# Patient Record
Sex: FEMALE | Race: WHITE
[De-identification: names, ages, dates, MRNs, and addresses within clinical notes are randomized per-mention and may not be internally consistent; named-entity substitution may affect disease eponyms.]

---

## 2020-08-01 ENCOUNTER — HOSPITAL ENCOUNTER (INPATIENT)
Dept: HOSPITAL 47 - EC | Age: 55
LOS: 4 days | Discharge: HOME | DRG: 871 | End: 2020-08-05
Attending: HOSPITALIST | Admitting: HOSPITALIST
Payer: COMMERCIAL

## 2020-08-01 DIAGNOSIS — U07.1: ICD-10-CM

## 2020-08-01 DIAGNOSIS — E87.1: ICD-10-CM

## 2020-08-01 DIAGNOSIS — F41.0: ICD-10-CM

## 2020-08-01 DIAGNOSIS — F41.9: ICD-10-CM

## 2020-08-01 DIAGNOSIS — Z98.890: ICD-10-CM

## 2020-08-01 DIAGNOSIS — R59.0: ICD-10-CM

## 2020-08-01 DIAGNOSIS — R79.82: ICD-10-CM

## 2020-08-01 DIAGNOSIS — J84.9: ICD-10-CM

## 2020-08-01 DIAGNOSIS — J12.89: ICD-10-CM

## 2020-08-01 DIAGNOSIS — R79.89: ICD-10-CM

## 2020-08-01 DIAGNOSIS — E07.9: ICD-10-CM

## 2020-08-01 DIAGNOSIS — R51: ICD-10-CM

## 2020-08-01 DIAGNOSIS — A41.9: Primary | ICD-10-CM

## 2020-08-01 DIAGNOSIS — Z82.49: ICD-10-CM

## 2020-08-01 DIAGNOSIS — Z88.2: ICD-10-CM

## 2020-08-01 DIAGNOSIS — Z87.891: ICD-10-CM

## 2020-08-01 DIAGNOSIS — J90: ICD-10-CM

## 2020-08-01 LAB
ALBUMIN SERPL-MCNC: 4.2 G/DL (ref 3.5–5)
ALP SERPL-CCNC: 64 U/L (ref 38–126)
ALT SERPL-CCNC: 18 U/L (ref 4–34)
ANION GAP SERPL CALC-SCNC: 10 MMOL/L
APTT BLD: 26.5 SEC (ref 22–30)
AST SERPL-CCNC: 20 U/L (ref 14–36)
BASOPHILS # BLD AUTO: 0 K/UL (ref 0–0.2)
BASOPHILS NFR BLD AUTO: 0 %
BUN SERPL-SCNC: 6 MG/DL (ref 7–17)
CALCIUM SPEC-MCNC: 9.3 MG/DL (ref 8.4–10.2)
CHLORIDE SERPL-SCNC: 100 MMOL/L (ref 98–107)
CO2 SERPL-SCNC: 23 MMOL/L (ref 22–30)
D DIMER PPP FEU-MCNC: 0.87 MG/L FEU (ref ?–0.6)
EOSINOPHIL # BLD AUTO: 0.5 K/UL (ref 0–0.7)
EOSINOPHIL NFR BLD AUTO: 3 %
ERYTHROCYTE [DISTWIDTH] IN BLOOD BY AUTOMATED COUNT: 4.87 M/UL (ref 3.8–5.4)
ERYTHROCYTE [DISTWIDTH] IN BLOOD: 12.6 % (ref 11.5–15.5)
FERRITIN SERPL-MCNC: 167.5 NG/ML (ref 10–291)
GLUCOSE SERPL-MCNC: 101 MG/DL (ref 74–99)
HCT VFR BLD AUTO: 46.8 % (ref 34–46)
HGB BLD-MCNC: 15.4 GM/DL (ref 11.4–16)
INR PPP: 0.9 (ref ?–1.2)
KETONES UR QL STRIP.AUTO: (no result)
LDH SPEC-CCNC: 477 U/L (ref 313–618)
LYMPHOCYTES # SPEC AUTO: 0.6 K/UL (ref 1–4.8)
LYMPHOCYTES NFR SPEC AUTO: 4 %
MAGNESIUM SPEC-SCNC: 1.8 MG/DL (ref 1.6–2.3)
MCH RBC QN AUTO: 31.6 PG (ref 25–35)
MCHC RBC AUTO-ENTMCNC: 32.9 G/DL (ref 31–37)
MCV RBC AUTO: 96.2 FL (ref 80–100)
MONOCYTES # BLD AUTO: 0.3 K/UL (ref 0–1)
MONOCYTES NFR BLD AUTO: 2 %
NEUTROPHILS # BLD AUTO: 14.8 K/UL (ref 1.3–7.7)
NEUTROPHILS NFR BLD AUTO: 91 %
PH UR: 5.5 [PH] (ref 5–8)
PLATELET # BLD AUTO: 303 K/UL (ref 150–450)
POTASSIUM SERPL-SCNC: 4.4 MMOL/L (ref 3.5–5.1)
PROT SERPL-MCNC: 7.1 G/DL (ref 6.3–8.2)
PT BLD: 9.7 SEC (ref 9–12)
SODIUM SERPL-SCNC: 133 MMOL/L (ref 137–145)
SP GR UR: 1.02 (ref 1–1.03)
UROBILINOGEN UR QL STRIP: 2 MG/DL (ref ?–2)
WBC # BLD AUTO: 16.2 K/UL (ref 3.8–10.6)

## 2020-08-01 PROCEDURE — 71275 CT ANGIOGRAPHY CHEST: CPT

## 2020-08-01 PROCEDURE — 83735 ASSAY OF MAGNESIUM: CPT

## 2020-08-01 PROCEDURE — 85652 RBC SED RATE AUTOMATED: CPT

## 2020-08-01 PROCEDURE — 85610 PROTHROMBIN TIME: CPT

## 2020-08-01 PROCEDURE — 93306 TTE W/DOPPLER COMPLETE: CPT

## 2020-08-01 PROCEDURE — 85379 FIBRIN DEGRADATION QUANT: CPT

## 2020-08-01 PROCEDURE — 80053 COMPREHEN METABOLIC PANEL: CPT

## 2020-08-01 PROCEDURE — 83615 LACTATE (LD) (LDH) ENZYME: CPT

## 2020-08-01 PROCEDURE — 85025 COMPLETE CBC W/AUTO DIFF WBC: CPT

## 2020-08-01 PROCEDURE — 99285 EMERGENCY DEPT VISIT HI MDM: CPT

## 2020-08-01 PROCEDURE — 84484 ASSAY OF TROPONIN QUANT: CPT

## 2020-08-01 PROCEDURE — 93005 ELECTROCARDIOGRAM TRACING: CPT

## 2020-08-01 PROCEDURE — 71045 X-RAY EXAM CHEST 1 VIEW: CPT

## 2020-08-01 PROCEDURE — 96375 TX/PRO/DX INJ NEW DRUG ADDON: CPT

## 2020-08-01 PROCEDURE — 82728 ASSAY OF FERRITIN: CPT

## 2020-08-01 PROCEDURE — 87040 BLOOD CULTURE FOR BACTERIA: CPT

## 2020-08-01 PROCEDURE — 96374 THER/PROPH/DIAG INJ IV PUSH: CPT

## 2020-08-01 PROCEDURE — 80306 DRUG TEST PRSMV INSTRMNT: CPT

## 2020-08-01 PROCEDURE — 81003 URINALYSIS AUTO W/O SCOPE: CPT

## 2020-08-01 PROCEDURE — 83605 ASSAY OF LACTIC ACID: CPT

## 2020-08-01 PROCEDURE — 85730 THROMBOPLASTIN TIME PARTIAL: CPT

## 2020-08-01 PROCEDURE — 86140 C-REACTIVE PROTEIN: CPT

## 2020-08-01 PROCEDURE — 87449 NOS EACH ORGANISM AG IA: CPT

## 2020-08-01 PROCEDURE — 94640 AIRWAY INHALATION TREATMENT: CPT

## 2020-08-01 PROCEDURE — 84145 PROCALCITONIN (PCT): CPT

## 2020-08-01 PROCEDURE — 36415 COLL VENOUS BLD VENIPUNCTURE: CPT

## 2020-08-01 RX ADMIN — ENOXAPARIN SODIUM SCH: 40 INJECTION SUBCUTANEOUS at 21:37

## 2020-08-01 RX ADMIN — ALBUTEROL SULFATE SCH PUFF: 90 AEROSOL, METERED RESPIRATORY (INHALATION) at 20:17

## 2020-08-01 NOTE — HP
HISTORY AND PHYSICAL



CHIEF COMPLAINTS:

Shortness of breath, cough and fever.



HISTORY OF PRESENT ILLNESS:

This 55-year-old woman with a past medical history of anxiety, history of 
nicotine

dependence, being followed by Dr. Henri Funez, not feeling well over the past 
several

days.  The patient had a cough, fever and some weakness.  The patient also 
complains of

shortness of breath.  The patient also complaining of increasing fatigue.  The 
patient

came to [facility] with similar complaints and chest x-ray showed 
bilateral

infiltrates and the lab values showed increased WBC, elevated 0.87.  C-reactive

protein elevated at 210.  CT angio showed patchy bilateral ground-glass 
appearance

suspicious for COVID-19.  Patient admitted for further evaluation and treatment.
1.2 cm

thyroid lesion was also noted to be followed up in the outpatient setting.  
Small

bilateral pleural effusions also noted.  The patient was apparently living on 
[facility]

and planning to move to [place] to be with her mother at this time and the 
patient has

been living with her parents for the last couple of days according to her.  The 
patient

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
 The

patient does not have any history of any recent travel or contact with infected 
persons

at this time.  There is no history any headache, loss of consciousness, 
seizures.  No

history of chest pain, palpitation either.



PAST MEDICAL HISTORY:

History of anxiety, history of nicotine dependence.



MEDICATIONS:

None.



ALLERGIES:

SULFA.



FAMILY HISTORY:

History of valve replacement with pacemaker.



SOCIAL HISTORY:

Previous history of smoking.  No history of alcohol intake.



REVIEW OF SYSTEMS:

ENT: No diminished vision. No diminished hearing.

CARDIOVASCULAR as mentioned earlier.

RESPIRATORY: As mentioned earlier.

GI: No nausea or vomiting.

 no dysuria.

NERVOUS SYSTEM: No numbness or weakness.

ALLERGY/IMMUNOLOGY: No asthma or hayfever.

MUSCULOSKELETAL: As mentioned earlier.

DERMATOLOGY: Negative.

ENDOCRINE: No history of diabetes or hypothyroidism.

CONSTITUTIONAL: As mentioned earlier.

DERMATOLOGY: Negative.

RHEUMATOLOGY negative.

PSYCHIATRY as mentioned earlier.



PHYSICAL EXAM:

Alert and oriented times three. Pulse 94, blood pressure 114/76, respiration 18,

temperature 98.4, pulse ox 98% on 2 L.

HEENT:  Conjunctivae normal. Oral mucosa moist.

Neck is no jugular venous distention.  No carotid bruit. No lymph node 
enlargement.

CARDIOVASCULAR: S1, S2 muffled.

RESPIRATORY: Breath sounds diminished in the bases. A few scattered rhonchi.  No

crackles.

ABDOMEN:  Soft, nontender.  No mass palpable.

LEGS no edema. No swelling.

NERVOUS SYSTEM: Higher functions as mentioned earlier. Moves all 4 limbs.  No 
focal

motor or sensory deficits.

Lymphatics: No lymph nodes palpable in the neck, axillae or groin.

SKIN: No ulcer, no rash and no bleeding.

JOINTS no active deforming arthropathy.



LABS:

WBC 16.2, and INR is 0.9.  D-dimer is 0.87.  Sodium is 133.  C-reactive protein 
is

210.9. Chest x-ray and CT scan personally reviewed.  EKG reviewed and showed

nonspecific ST-T changes and QTc of 428.



ASSESSMENT:

1. Acute bilateral pneumonia possibly gram-negative, possibly Covid-19 
pneumonia.

2. Increased WBC.

3. Increased D-dimer without any evidence of pulmonary embolism.

4. Right thyroid lesion 1.2 cm needs outpatient followup.

5. Hyponatremia.

6. Increased CRP.

7. History of anxiety.

8. History of nicotine dependence.

9. FULL CODE.



RECOMMENDATIONS AND DISCUSSION:

This 55-year-old woman who presented with multiple complex medical issues, at 
this time

I recommend continue the current medications, continue symptomatic treatment.

Recommend broad-spectrum IV antibiotics for presumed pneumonia. COVID-19 test is

pending.  I would also recommend infectious disease evaluation.  I would 
recommend

blood cultures, sputum cultures, and other evaluation to rule out the 
possibility of

Covid-19 as well. LDH is negative.  Overall prognosis guarded because of 
multiple

complex medical issues and further recommendations to follow.  A copy of this 
forwarded

to Dr. Henri Funez who is the primary physician.





MMODL / IJN: [id number] / Job#: [id number]

MADIHA

## 2020-08-01 NOTE — XR
EXAMINATION TYPE: XR chest 1V portable

 

DATE OF EXAM: 8/1/2020

 

HISTORY: Suspected COVID-19 pneumonia.

 

REFERENCE: NONE.

 

FINDINGS: Lung volumes are prominent. There is patchy peripheral airspace disease seen bilaterally. T
he heart is not enlarged. Pleural spaces are clear.

 

IMPRESSION: 

1. PATCHY, BILATERAL AIRSPACE DISEASE.

2. PLEASE CORRELATE FOR COPD.

## 2020-08-01 NOTE — ED
General Adult HPI





- General


Chief complaint: Shortness of Breath


Stated complaint: SOB


Time Seen by Provider: 20 11:45


Source: patient, RN notes reviewed, old records reviewed


Mode of arrival: wheelchair


Limitations: no limitations





- History of Present Illness


Initial comments: 





This a 55-year-old female who presents emergency Department complaining of 

shortness of breath for 2 days and a low-grade fever as of yesterday.  Patient 

states she's also been very fatigued.  Patient states she has been moving for 

the last few days and is moving out of state to [place].  Patient states she's 

been under quite a bit of stress and has had a couple of panic attacks as well. 

Patient also has noticed her heart rate is racing.  Patient denies any chest p

ain but she states she does feel like it's tight in her chest when she has been 

having a panic attacks.  Patient denies any abdominal pain patient denies nausea

vomiting diarrhea.  Patient has a headache patient denies numbness weakness.  

Patient denies any swelling to the legs or calf tenderness.





- Related Data


                                Home Medications











 Medication  Instructions  Recorded  Confirmed


 


No Known Home Medications  20











                                    Allergies











Allergy/AdvReac Type Severity Reaction Status Date / Time


 


Sulfa (Sulfonamide Allergy  Rash/Hives Verified 20 14:41





Antibiotics)     














Review of Systems


ROS Statement: 


Those systems with pertinent positive or pertinent negative responses have been 

documented in the HPI.





ROS Other: All systems not noted in ROS Statement are negative.





Past Medical History


Past Medical History: No Reported History


History of Any Multi-Drug Resistant Organisms: None Reported


Past Surgical History:  Section


Past Psychological History: Anxiety


Smoking Status: Former smoker


Past Alcohol Use History: Occasional


Past Drug Use History: None Reported





General Exam





- General Exam Comments


Initial Comments: 





GENERAL:


Patient is well-developed and well-nourished.  Patient is nontoxic and well-

hydrated and is in mild distress.





ENT:


Neck is soft and supple.  No significant lymphadenopathy is noted.  Oropharynx 

is clear.  Moist mucous membranes.  Neck has full range of motion without 

eliciting any pain. 





EYES:


The sclera were anicteric and conjunctiva were pink and moist.  Extraocular 

movements were intact and pupils were equal round and reactive to light.  

Eyelids were unremarkable.





PULMONARY:


Unlabored respirations.  Good breath sounds bilaterally.  No audible rales rhon

chi or wheezing was noted.





CARDIOVASCULAR:


Patient is tachycardic





ABDOMEN:


Soft and nontender with normal bowel sounds.  





SKIN:


Skin is clear with no lesions or rashes and otherwise unremarkable.





NEUROLOGIC:


Patient is alert and oriented x3.  Cranial nerves II through XII are grossly 

intact.  Motor and sensory are also intact.  Normal speech, volume and content. 

Symmetrical smile.  





MUSCULOSKELETAL:


Normal extremities with adequate strength and full range of motion.  





LYMPHATICS:


No significant lymphadenopathy is noted





PSYCHIATRIC:


Normal psychiatric evaluation.  


Limitations: no limitations





Course


                                   Vital Signs











  20





  11:42 11:58 12:00


 


Temperature 100.1 F H  


 


Pulse Rate 131 H  


 


Respiratory 22 18 18





Rate   


 


Blood Pressure 122/75  


 


O2 Sat by Pulse 92 L 93 L 93 L





Oximetry   














  20





  12:30 13:00 13:30


 


Temperature   


 


Pulse Rate 110 H 102 H 92


 


Respiratory 18 18 16





Rate   


 


Blood Pressure  119/82 116/74


 


O2 Sat by Pulse 96 97 97





Oximetry   














  20





  14:00 14:22


 


Temperature  98.5 F


 


Pulse Rate 86 


 


Respiratory 18 





Rate  


 


Blood Pressure  


 


O2 Sat by Pulse 98 





Oximetry  














Medical Decision Making





- Medical Decision Making





EKG shows sinus tachycardia 160 bpm ME interval 124 tresses 80 QT interval 308 

QTC is 428.  Patient's EKG shows no ST segment elevation or depression.





Chest x-ray shows groundglass infiltrate.





Computed tomography scan shows no pulmonary embolism but does show bilateral 

infiltrates.





- Lab Data


Result diagrams: 


                                 20 12:12





                                 20 12:12


                                   Lab Results











  20 Range/Units





  12:12 12:12 12:12 


 


WBC  16.2 H    (3.8-10.6)  k/uL


 


RBC  4.87    (3.80-5.40)  m/uL


 


Hgb  15.4    (11.4-16.0)  gm/dL


 


Hct  46.8 H    (34.0-46.0)  %


 


MCV  96.2    (80.0-100.0)  fL


 


MCH  31.6    (25.0-35.0)  pg


 


MCHC  32.9    (31.0-37.0)  g/dL


 


RDW  12.6    (11.5-15.5)  %


 


Plt Count  303    (150-450)  k/uL


 


Neutrophils %  91    %


 


Lymphocytes %  4    %


 


Monocytes %  2    %


 


Eosinophils %  3    %


 


Basophils %  0    %


 


Neutrophils #  14.8 H    (1.3-7.7)  k/uL


 


Lymphocytes #  0.6 L    (1.0-4.8)  k/uL


 


Monocytes #  0.3    (0-1.0)  k/uL


 


Eosinophils #  0.5    (0-0.7)  k/uL


 


Basophils #  0.0    (0-0.2)  k/uL


 


PT   9.7   (9.0-12.0)  sec


 


INR   0.9   (<1.2)  


 


APTT   26.5   (22.0-30.0)  sec


 


D-Dimer   0.87 H   (<0.60)  mg/L FEU


 


Sodium    133 L  (137-145)  mmol/L


 


Potassium    4.4  (3.5-5.1)  mmol/L


 


Chloride    100  ()  mmol/L


 


Carbon Dioxide    23  (22-30)  mmol/L


 


Anion Gap    10  mmol/L


 


BUN    6 L  (7-17)  mg/dL


 


Creatinine    0.51 L  (0.52-1.04)  mg/dL


 


Est GFR (CKD-EPI)AfAm    >90  (>60 ml/min/1.73 sqM)  


 


Est GFR (CKD-EPI)NonAf    >90  (>60 ml/min/1.73 sqM)  


 


Glucose    101 H  (74-99)  mg/dL


 


Plasma Lactic Acid Nikhil     (0.7-2.0)  mmol/L


 


Calcium    9.3  (8.4-10.2)  mg/dL


 


Magnesium    1.8  (1.6-2.3)  mg/dL


 


Total Bilirubin    1.0  (0.2-1.3)  mg/dL


 


AST    20  (14-36)  U/L


 


ALT    18  (4-34)  U/L


 


Alkaline Phosphatase    64  ()  U/L


 


Lactate Dehydrogenase    477  (313-618)  U/L


 


C-Reactive Protein    210.9 H  (<10.0)  mg/L


 


Total Protein    7.1  (6.3-8.2)  g/dL


 


Albumin    4.2  (3.5-5.0)  g/dL














  20 Range/Units





  12:12 


 


WBC   (3.8-10.6)  k/uL


 


RBC   (3.80-5.40)  m/uL


 


Hgb   (11.4-16.0)  gm/dL


 


Hct   (34.0-46.0)  %


 


MCV   (80.0-100.0)  fL


 


MCH   (25.0-35.0)  pg


 


MCHC   (31.0-37.0)  g/dL


 


RDW   (11.5-15.5)  %


 


Plt Count   (150-450)  k/uL


 


Neutrophils %   %


 


Lymphocytes %   %


 


Monocytes %   %


 


Eosinophils %   %


 


Basophils %   %


 


Neutrophils #   (1.3-7.7)  k/uL


 


Lymphocytes #   (1.0-4.8)  k/uL


 


Monocytes #   (0-1.0)  k/uL


 


Eosinophils #   (0-0.7)  k/uL


 


Basophils #   (0-0.2)  k/uL


 


PT   (9.0-12.0)  sec


 


INR   (<1.2)  


 


APTT   (22.0-30.0)  sec


 


D-Dimer   (<0.60)  mg/L FEU


 


Sodium   (137-145)  mmol/L


 


Potassium   (3.5-5.1)  mmol/L


 


Chloride   ()  mmol/L


 


Carbon Dioxide   (22-30)  mmol/L


 


Anion Gap   mmol/L


 


BUN   (7-17)  mg/dL


 


Creatinine   (0.52-1.04)  mg/dL


 


Est GFR (CKD-EPI)AfAm   (>60 ml/min/1.73 sqM)  


 


Est GFR (CKD-EPI)NonAf   (>60 ml/min/1.73 sqM)  


 


Glucose   (74-99)  mg/dL


 


Plasma Lactic Acid Nikhil  1.0  (0.7-2.0)  mmol/L


 


Calcium   (8.4-10.2)  mg/dL


 


Magnesium   (1.6-2.3)  mg/dL


 


Total Bilirubin   (0.2-1.3)  mg/dL


 


AST   (14-36)  U/L


 


ALT   (4-34)  U/L


 


Alkaline Phosphatase   ()  U/L


 


Lactate Dehydrogenase   (313-618)  U/L


 


C-Reactive Protein   (<10.0)  mg/L


 


Total Protein   (6.3-8.2)  g/dL


 


Albumin   (3.5-5.0)  g/dL














Disposition


Clinical Impression: 


 Pneumonia





Disposition: ADMITTED AS IP TO THIS [facility]


Referrals: 


Luis Funez MD [Primary Care Provider] - 1-2 days


Time of Disposition: 14:44

## 2020-08-01 NOTE — CT
EXAMINATION TYPE: CT chest angio for PE

 

DATE OF EXAM: 8/1/2020

 

COMPARISON: None.

 

HISTORY: Difficulty breathing

 

CT DLP: 215.9 mGycm

Automated exposure control for dose reduction was used.

 

CONTRAST: 

CT Chest for pulmonary embolism performed with with IV Contrast, patient injected with 100 mL of Isov
ue 370.

 

FINDINGS: There is apical scarring present bilaterally. There are patchy groundglass opacities in the
 periphery of both lungs suspicious for Covid 19 infection. There is more confluent disease or atelec
tasis at the lung bases bilaterally.

 

There is a 1.2 cm lesion in the right lobe of the thyroid.

 

There is some left axillary adenopathy. The largest lymph node is 6.8 mm in shortest transverse diame
ter. There is no cyst significant mediastinal or hilar adenopathy.

 

There is no evidence of pulmonary embolus.

 

Aorta is normal in caliber without evidence of dissection.

 

There are small, bilateral pleural effusions. There is no pericardial fluid the heart is not enlarged
.

 

Visualized upper abdominal structures are unremarkable.

 

No bony lesion is seen.

 

IMPRESSION:

1. THIS EXAMINATION IS NEGATIVE FOR PULMONARY EMBOLUS.

2. PATCHY, BILATERAL GROUNDGLASS AIRSPACE DISEASE IS SUSPICIOUS FOR COVID 19 INFECTION. PLEASE CORREL
ATE CLINICALLY

3. 1.2 CM RIGHT THYROID LESION.

4. NONSPECIFIC MEDIASTINAL ADENOPATHY.

5. SMALL, BILATERAL PLEURAL EFFUSIONS.

## 2020-08-02 LAB
ALBUMIN SERPL-MCNC: 3.6 G/DL (ref 3.5–5)
ALP SERPL-CCNC: 61 U/L (ref 38–126)
ALT SERPL-CCNC: 15 U/L (ref 4–34)
ANION GAP SERPL CALC-SCNC: 9 MMOL/L
AST SERPL-CCNC: 17 U/L (ref 14–36)
BASOPHILS # BLD AUTO: 0 K/UL (ref 0–0.2)
BASOPHILS NFR BLD AUTO: 0 %
BUN SERPL-SCNC: 6 MG/DL (ref 7–17)
CALCIUM SPEC-MCNC: 8.7 MG/DL (ref 8.4–10.2)
CHLORIDE SERPL-SCNC: 98 MMOL/L (ref 98–107)
CO2 SERPL-SCNC: 26 MMOL/L (ref 22–30)
EOSINOPHIL # BLD AUTO: 0.4 K/UL (ref 0–0.7)
EOSINOPHIL NFR BLD AUTO: 3 %
ERYTHROCYTE [DISTWIDTH] IN BLOOD BY AUTOMATED COUNT: 4.61 M/UL (ref 3.8–5.4)
ERYTHROCYTE [DISTWIDTH] IN BLOOD: 12.5 % (ref 11.5–15.5)
GLUCOSE SERPL-MCNC: 81 MG/DL (ref 74–99)
HCT VFR BLD AUTO: 45.3 % (ref 34–46)
HGB BLD-MCNC: 14.8 GM/DL (ref 11.4–16)
LYMPHOCYTES # SPEC AUTO: 0.8 K/UL (ref 1–4.8)
LYMPHOCYTES NFR SPEC AUTO: 6 %
MCH RBC QN AUTO: 32 PG (ref 25–35)
MCHC RBC AUTO-ENTMCNC: 32.6 G/DL (ref 31–37)
MCV RBC AUTO: 98.3 FL (ref 80–100)
MONOCYTES # BLD AUTO: 0.4 K/UL (ref 0–1)
MONOCYTES NFR BLD AUTO: 3 %
NEUTROPHILS # BLD AUTO: 11.7 K/UL (ref 1.3–7.7)
NEUTROPHILS NFR BLD AUTO: 87 %
PLATELET # BLD AUTO: 278 K/UL (ref 150–450)
POTASSIUM SERPL-SCNC: 4.3 MMOL/L (ref 3.5–5.1)
PROT SERPL-MCNC: 6.2 G/DL (ref 6.3–8.2)
SODIUM SERPL-SCNC: 133 MMOL/L (ref 137–145)
WBC # BLD AUTO: 13.4 K/UL (ref 3.8–10.6)

## 2020-08-02 RX ADMIN — FLUTICASONE PROPIONATE SCH PUFF: 110 AEROSOL, METERED RESPIRATORY (INHALATION) at 20:57

## 2020-08-02 RX ADMIN — Medication SCH MG: at 12:31

## 2020-08-02 RX ADMIN — ACETAMINOPHEN PRN MG: 500 TABLET ORAL at 23:02

## 2020-08-02 RX ADMIN — ALBUTEROL SULFATE SCH PUFF: 90 AEROSOL, METERED RESPIRATORY (INHALATION) at 12:16

## 2020-08-02 RX ADMIN — ALBUTEROL SULFATE SCH PUFF: 90 AEROSOL, METERED RESPIRATORY (INHALATION) at 16:38

## 2020-08-02 RX ADMIN — ALBUTEROL SULFATE SCH PUFF: 90 AEROSOL, METERED RESPIRATORY (INHALATION) at 20:57

## 2020-08-02 RX ADMIN — ACETAMINOPHEN PRN MG: 500 TABLET ORAL at 00:33

## 2020-08-02 RX ADMIN — ACETAMINOPHEN PRN MG: 500 TABLET ORAL at 08:25

## 2020-08-02 RX ADMIN — ENOXAPARIN SODIUM SCH: 40 INJECTION SUBCUTANEOUS at 08:18

## 2020-08-02 RX ADMIN — GUAIFENESIN PRN MG: 200 SOLUTION ORAL at 16:32

## 2020-08-02 RX ADMIN — TEMAZEPAM PRN MG: 15 CAPSULE ORAL at 01:25

## 2020-08-02 RX ADMIN — AZITHROMYCIN SCH MG: 500 TABLET, FILM COATED ORAL at 15:44

## 2020-08-02 RX ADMIN — ACETAMINOPHEN PRN MG: 500 TABLET ORAL at 15:44

## 2020-08-02 RX ADMIN — OXYCODONE HYDROCHLORIDE AND ACETAMINOPHEN SCH MG: 500 TABLET ORAL at 11:49

## 2020-08-02 RX ADMIN — CEFAZOLIN SCH MLS/HR: 330 INJECTION, POWDER, FOR SOLUTION INTRAMUSCULAR; INTRAVENOUS at 13:53

## 2020-08-02 RX ADMIN — ALBUTEROL SULFATE SCH PUFF: 90 AEROSOL, METERED RESPIRATORY (INHALATION) at 08:42

## 2020-08-02 RX ADMIN — GUAIFENESIN PRN MG: 200 SOLUTION ORAL at 23:01

## 2020-08-02 NOTE — PN
PROGRESS NOTE



DATE OF SERVICE:

08/02/2020



This 55-year-old woman was admitted with acute bilateral pneumonia, possibly

interstitial pneumonia, possibly viral pneumonia and Covid-19 pneumonia strongly

suspected but however the testing is pending at this time.  Patient started on 
broad-

spectrum IV antibiotics.  Patient being closely monitored.



Past Medical history reviewed.



REVIEW OF SYSTEMS:

CARDIOVASCULAR:  No angina or palpitations.

RESPIRATORY: As mentioned earlier.

GI: No nausea, vomiting.

 no dysuria.

Nervous system: No numbness or weakness.



CURRENT MEDICATIONS:

Reviewed and include: Tylenol, Norco, Xanax, vitamin C, Zithromax, Rocephin 1 g 
and

Lovenox, Flovent, Dilaudid, Restoril  doses reviewed



PHYSICAL EXAM:

Patient is alert, oriented x3.  Pulse is 105.  Blood pressure is 90/56, 
respiration 20,

temperature 100.6, pulse ox 94% on 3 L.

HEENT: Conjunctivae normal.

NECK: No JVD.

CARDIOVASCULAR: S1, S2 muffled.

RESPIRATIONS: Breath sounds diminished in  the bases. A few scattered rhonchi 
and

crackles.

ABDOMEN:  Soft, nontender.

LEGS: No edema.

NERVOUS SYSTEM: No focal deficits.



LABS:

WBC 13.4, sodium 133.



ASSESSMENT:

1. Acute bilateral interstitial pneumonia, possibly Covid-19 pneumonia, viral

    pneumonia.

2. Increased WBC.

3. Increased D-dimer without any evidence of acute pulmonary embolism.

4. Right thyroid lesion 1.2 cm needs outpatient followup.

5. Hyponatremia.

6. Increased CRP.

7. History of anxiety.

8. History of nicotine dependence.

9. FULL CODE.



RECOMMENDATIONS AND DISCUSSION:

Recommend to continue current medications, management and symptomatic treatment.

Otherwise continue empiric antibiotics.  Closely follow with Infectious Disease 
and

Pulmonary.  Guarded prognosis because of multiple complex medical issues.  
Further

recommendations to follow.  The QTc is 428 milliseconds.  Further 
recommendations to

follow.  Covid 19 is pending at this time.





MMODL / IJN: [id number] / Job#: [id number]

[facility]

## 2020-08-02 NOTE — XR
EXAMINATION TYPE: XR chest 1V

 

DATE OF EXAM: 8/2/2020

 

HISTORY: pneumonia.

 

REFERENCE: Previous study dated 8/1/2020.

 

FINDINGS: The lungs are overinflated but clear. Pleural space are clear. The heart is not enlarged.

 

IMPRESSION: 

 

COPD.

## 2020-08-03 LAB
ALBUMIN SERPL-MCNC: 3.4 G/DL (ref 3.5–5)
ALP SERPL-CCNC: 64 U/L (ref 38–126)
ALT SERPL-CCNC: 13 U/L (ref 4–34)
ANION GAP SERPL CALC-SCNC: 5 MMOL/L
AST SERPL-CCNC: 12 U/L (ref 14–36)
BASOPHILS # BLD AUTO: 0 K/UL (ref 0–0.2)
BASOPHILS NFR BLD AUTO: 0 %
BUN SERPL-SCNC: 6 MG/DL (ref 7–17)
CALCIUM SPEC-MCNC: 9 MG/DL (ref 8.4–10.2)
CHLORIDE SERPL-SCNC: 103 MMOL/L (ref 98–107)
CO2 SERPL-SCNC: 29 MMOL/L (ref 22–30)
EOSINOPHIL # BLD AUTO: 0.7 K/UL (ref 0–0.7)
EOSINOPHIL NFR BLD AUTO: 6 %
ERYTHROCYTE [DISTWIDTH] IN BLOOD BY AUTOMATED COUNT: 4.47 M/UL (ref 3.8–5.4)
ERYTHROCYTE [DISTWIDTH] IN BLOOD: 12.4 % (ref 11.5–15.5)
GLUCOSE SERPL-MCNC: 98 MG/DL (ref 74–99)
HCT VFR BLD AUTO: 43.7 % (ref 34–46)
HGB BLD-MCNC: 14.3 GM/DL (ref 11.4–16)
LYMPHOCYTES # SPEC AUTO: 0.9 K/UL (ref 1–4.8)
LYMPHOCYTES NFR SPEC AUTO: 8 %
MCH RBC QN AUTO: 32.1 PG (ref 25–35)
MCHC RBC AUTO-ENTMCNC: 32.8 G/DL (ref 31–37)
MCV RBC AUTO: 97.8 FL (ref 80–100)
MONOCYTES # BLD AUTO: 0.4 K/UL (ref 0–1)
MONOCYTES NFR BLD AUTO: 4 %
NEUTROPHILS # BLD AUTO: 8.9 K/UL (ref 1.3–7.7)
NEUTROPHILS NFR BLD AUTO: 81 %
PLATELET # BLD AUTO: 319 K/UL (ref 150–450)
POTASSIUM SERPL-SCNC: 5.2 MMOL/L (ref 3.5–5.1)
PROT SERPL-MCNC: 6 G/DL (ref 6.3–8.2)
SODIUM SERPL-SCNC: 137 MMOL/L (ref 137–145)
WBC # BLD AUTO: 11 K/UL (ref 3.8–10.6)

## 2020-08-03 RX ADMIN — ALBUTEROL SULFATE SCH: 90 AEROSOL, METERED RESPIRATORY (INHALATION) at 21:54

## 2020-08-03 RX ADMIN — Medication SCH MG: at 08:29

## 2020-08-03 RX ADMIN — CEFAZOLIN SCH MLS/HR: 330 INJECTION, POWDER, FOR SOLUTION INTRAMUSCULAR; INTRAVENOUS at 20:27

## 2020-08-03 RX ADMIN — OXYCODONE HYDROCHLORIDE AND ACETAMINOPHEN SCH MG: 500 TABLET ORAL at 08:28

## 2020-08-03 RX ADMIN — FLUTICASONE PROPIONATE SCH PUFF: 110 AEROSOL, METERED RESPIRATORY (INHALATION) at 09:35

## 2020-08-03 RX ADMIN — ALBUTEROL SULFATE SCH PUFF: 90 AEROSOL, METERED RESPIRATORY (INHALATION) at 16:57

## 2020-08-03 RX ADMIN — NYSTATIN SCH UNIT: 100000 SUSPENSION ORAL at 20:28

## 2020-08-03 RX ADMIN — NYSTATIN SCH UNIT: 100000 SUSPENSION ORAL at 17:38

## 2020-08-03 RX ADMIN — ENOXAPARIN SODIUM SCH: 40 INJECTION SUBCUTANEOUS at 08:29

## 2020-08-03 RX ADMIN — ALBUTEROL SULFATE SCH PUFF: 90 AEROSOL, METERED RESPIRATORY (INHALATION) at 12:14

## 2020-08-03 RX ADMIN — ALBUTEROL SULFATE SCH PUFF: 90 AEROSOL, METERED RESPIRATORY (INHALATION) at 09:34

## 2020-08-03 RX ADMIN — FLUTICASONE PROPIONATE SCH: 110 AEROSOL, METERED RESPIRATORY (INHALATION) at 21:54

## 2020-08-03 RX ADMIN — AZITHROMYCIN SCH MG: 500 TABLET, FILM COATED ORAL at 14:43

## 2020-08-03 NOTE — PN
PROGRESS NOTE



DATE OF SERVICE:

08/03/2020



This 55-year-old woman who was admitted with acute bilateral pneumonia, possible

interstitial pneumonia, has strongly suspect viral pneumonia and COVID-19. The 
testing

is pending at this time.  The most recent chest x-ray, which was done yesterday,
showed

bilateral lesions. Multiple consultants are following the patient closely, 
including

Pulmonary. A 2D echo with Doppler was done by Cardiology which showed an 
ejection

fraction 60% to 65%.  Nonspecific mediastinal adenopathy was noted.  No chest 
pain. No

palpitation. Past medical history reviewed.



REVIEW OF SYSTEMS:

CARDIOVASCULAR SYSTEM: No angina, palpitations.

RESPIRATORY SYSTEM: As mentioned earlier.

GI: As mentioned earlier.

: No dysuria or retention.

NERVOUS SYSTEM: No numbness, weakness.



CURRENT MEDICATIONS:

Reviewed. They include:

1. Tylenol p.r.n.

2. Norco 5 mg q.6 p.r.n.

3. Xanax 0.25 t.i.d.

4. Vitamin C.

5. Zithromax.

6. Rocephin.

7. Hexadrol.

8. Lovenox.

9. Flovent.

10.Robitussin.

11.Dilaudid.

12.Mycostatin.

13.Restoril.

14.Orazinc.

Doses are reviewed.



PHYSICAL EXAMINATION:

Patient is alert, oriented x3.  Pulse 75, blood pressure 112/75, respiration 18,

temperature 97.8, pulse ox 100% on 2 L.

HEENT: Conjunctivae normal.

NECK: No jugular venous distention.

CARDIOVASCULAR SYSTEM:  S1, S2 muffled.

RESPIRATORY SYSTEM: Breath sounds diminished at the bases.  Bilateral scattered 
rhonchi

and crackles.

ABDOMEN:  Soft, non-tender.

LEGS: No edema. No swelling.

NERVOUS SYSTEM: Higher functions as mentioned earlier. Moves all 4 limbs. No 
focal

motor or sensory deficit.

LYMPHATICS: No lymph node palpable in neck, axillae or groin.

SKIN: No ulcer, rash, bleeding.

JOINTS: No active deforming arthropathy.



LABS:

WBC 11, hemoglobin 14.3, sodium 137, potassium 5.2, albumin 3.4.



ASSESSMENT:

1. Acute bilateral interstitial pneumonia, possibly COVID-19 pneumonia and viral

    pneumonia.

2. Increased white count.

3. Increased D-dimer without any evidence of acute pulmonary embolism.

4. Right thyroid lesion, 1.2 cm. Needs outpatient followup  CT scan.

5. Hyponatremia.

6. Increased CRP.

7. History of anxiety.

8. History of nicotine dependence.

9. FULL CODE.



RECOMMENDATIONS AND DISCUSSION:

In this 55-year-old woman who presented with multiple complex medical issues, we
will

monitor the patient closely, continue the current medications, continue 
symptomatic

treatment. Otherwise at this time I recommend continuing with the 
bronchodilators.

Empiric antibiotics.  Repeat labs.  Await COVID-19 testing. Guarded prognosis.  
Further

recommendations to follow. Closely follow with Pulmonary.





MMODL / IJN: [id number] / Job#: [id number]

MADIHA

## 2020-08-03 NOTE — PN
PROGRESS NOTE



DATE OF SERVICE:

08/03/2020



REASON FOR FOLLOWUP:

Pneumonia.



INTERVAL HISTORY:

The patient is currently afebrile.  The patient is breathing slightly comfortably.  The

patient's cough has decreased in intensity, less purulent now.  No nausea, no vomiting.

No abdominal pain or diarrhea.



PHYSICAL EXAMINATION:

Blood pressure 109/70 with a pulse of 82, temperature 98.1. She is 95% on 2 L nasal

cannula.

General description is a middle-aged female up in the bed in no distress.

RESPIRATORY SYSTEM: Unlabored breathing with decreased intensity of breath sounds. No

wheeze.

HEART: S1, S2.  Regular rate and rhythm.

ABDOMEN: Soft. No tenderness.



LABS:

Hemoglobin is 14.8, white count 11, BUN of 6, creatinine 0.43.  Urine for Legionella

antigen was requested and is currently pending. COVID-19 testing is pending as well.



DIAGNOSTIC IMPRESSION AND PLAN:

Patient admitted to hospital with fever and a concern for pneumonia with concern for

possible atypical bacterial pneumonia.  Urine for Legionella antigen is pending.

Patient to continue Rocephin and Zithromax. Monitor clinical course closely.





MMODL / IJN: [id number] / Job#: [id number]

## 2020-08-03 NOTE — P.PN
Subjective


Progress Note Date: 08/03/20


Principal diagnosis: 


 COVID 19 infection





 August 3, 2020 patient seen in follow-up on the general medical surgical floor.

 Patient is awake and alert, in no acute distress, she is currently on 2 L of 

oxygen the pulse ox 100%, hemodynamically patient is stable, afebrile.  Coughing

less, breathing seems to be nonlabored.  She remains on a combination of 

azithromycin and Rocephin and Decadron.  Today's labs have been reviewed, show a

blood cell count is 11.0, lymphocytes 0.9, sodium is 137, potassium is 5.2, 

depressed electrolytes and renal profile were unremarkable.  Blood culture show 

no growth.  This had no acute events overnight, no reports of nausea vomiting no

diarrhea.  Echocardiogram has been reviewed showing preserved left ventricle 

systolic function and EF of 60-65%








Objective





- Vital Signs


Vital signs: 


                                   Vital Signs











Temp  97.8 F   08/03/20 07:00


 


Pulse  75   08/03/20 08:33


 


Resp  18   08/03/20 08:33


 


BP  112/71   08/03/20 07:00


 


Pulse Ox  100   08/03/20 07:00








                                 Intake & Output











 08/02/20 08/03/20 08/03/20





 18:59 06:59 18:59


 


Other:   


 


  # Voids 4 1 














- Exam


 GENERAL EXAM: Alert, very pleasant, 55-year-old white female, on 2 L of oxygen 

a pulse ox 100%, resting comfortably in bed, comfortable in no apparent 

distress.


HEAD: Normocephalic/atraumatic.


EYES: Normal reaction of pupils, equal size.  Conjunctiva pink, sclera white.


NOSE: Clear with pink turbinates.


THROAT: No erythema or exudates.


NECK: No masses, no JVD, no thyroid enlargement, no adenopathy.


CHEST: No chest wall deformity.  Symmetrical expansion. 


LUNGS: Equal air entry with no crackles, wheeze, rhonchi or dullness.


CVS: Regular rate and rhythm, normal S1 and S2, no gallops, no murmurs, no rubs


ABDOMEN: Soft, nontender.  No hepatosplenomegaly, normal bowel sounds, no guardi

ng or rigidity.


EXTREMITIES: No clubbing, no edema, no cyanosis, 2+ pulses and upper and lower 

extremities.


MUSCULOSKELETAL: Muscle strength and tone normal.


SPINE: No scoliosis or deformity


SKIN: No rashes


CENTRAL NERVOUS SYSTEM: Alert and oriented -3.  No focal deficits, tone is 

normal in all 4 extremities.


PSYCHIATRIC: Alert and oriented -3.  Appropriate affect.  Intact judgment and 

insight.











- Labs


CBC & Chem 7: 


                                 08/03/20 06:48





                                 08/03/20 06:48


Labs: 


                  Abnormal Lab Results - Last 24 Hours (Table)











  08/03/20 08/03/20 Range/Units





  06:48 06:48 


 


WBC  11.0 H   (3.8-10.6)  k/uL


 


Neutrophils #  8.9 H   (1.3-7.7)  k/uL


 


Lymphocytes #  0.9 L   (1.0-4.8)  k/uL


 


Potassium   5.2 H  (3.5-5.1)  mmol/L


 


BUN   6 L  (7-17)  mg/dL


 


Creatinine   0.43 L  (0.52-1.04)  mg/dL


 


AST   12 L  (14-36)  U/L


 


Total Protein   6.0 L  (6.3-8.2)  g/dL


 


Albumin   3.4 L  (3.5-5.0)  g/dL








                      Microbiology - Last 24 Hours (Table)











 08/01/20 12:51 Blood Culture - Preliminary





 Blood    No Growth after 24 hours














Assessment and Plan


Plan: 


 Assessment:





#1.  Suspected Covid 19 viral infection, COVID 19 PCR is pending.  Patient 

presented to the hospital with a fever, tachycardia, patchy bilateral airspace 

disease, and patchy bilateral groundglass airspace disease and the CTA chest, 

specific mediastinal adenopathy and small bilateral pleural effusions





#2.  Rule out possibility of underlying bacterial pneumonia, Legionella urine 

antigen is pending, although pro-calcitonin level was not significantly elevated

only at 0.10.  Patient is covered with a combination of azithromycin and 

Rocephin





#3.  Previous history of tobacco use currently in remission





#4.  Mild leukocytosis improving





#5.  Mild elevation of d-dimer 0.87, CTA chest did not show any evidence of p

ulmonary embolism, CRP elevated at 210, and LDH is 335 with the possibility of 

Covid 19 related infection





Plan:





Continue current medical treatment, he is maintaining stable O2 saturations on 2

L, afebrile, she looks comfortable, no worsening dyspnea, no acute events 

overnight.  She continues on combination of azithromycin and Rocephin.  Her pro-

calcitonin level was not elevated and was only at 0.10.  Overall feeling better.

 No nausea vomiting no diarrhea.  Continue bronchodilators.  We'll continue to 

follow





I performed a history & physical examination of the patient and discussed their 

management with my nurse practitioner, Sarah Valdez.  I reviewed the nurse 

practitioner's note and agree with the documented findings and plan of care.  

Lung sounds are positive for diminished.  The findings and the impression was 

discussed with the patient.  I attest to the documentation by the nurse 

practitioner. 





Time with Patient: Less than 30

## 2020-08-03 NOTE — ECHOF
Referral Reason:elevated d diamer



MEASUREMENTS

--------

HEIGHT: 157.5 cm

WEIGHT: 56.2 kg

BP: 107/64

IVSd:   1.0 cm     (0.6 - 1.1)

LVIDd:   3.8 cm     (3.9 - 5.3)

LVPWd:   1.0 cm     (0.6 - 1.1)

IVSs:   1.5 cm

LVIDs:   2.3 cm

LVPWs:   1.3 cm

LA Diam:   2.7 cm     (2.7 - 3.8)

RVIDd:   2.8 cm     (< 3.3)

LAESV Index (A-L):   20.11 ml/m

Ao Diam:   2.8 cm     (2.0 - 3.7)

AV Cusp:   2.0 cm     (1.5 - 2.6)

EPSS:   0.3 cm

MV E Irving:   0.89 m/s

MV DecT:   265 ms

MV A Irving:   0.73 m/s

MV E/A Ratio:   1.21 

RAP:   5.00 mmHg

RVSP:   17.74 mmHg

MV EF SLOPE:   98.48 mm/s     (70 - 150)

MV EXCURSION:   13.25 mm     (> 18.000)







FINDINGS

--------

Sinus rhythm.

This was a technically good study.

The left ventricular size is normal.   Left ventricular wall thickness is normal.   Overall left vent
ricular systolic function is normal with, an EF between 60 - 65 %.

The right ventricle is normal in size.

Normal LA  size by volume 22+/-6 ml/m2.

The right atrium is normal in size.

Interatrial and interventricular septum intact.

The aortic valve is trileaflet and appears structurally normal.

The mitral valve leaflets are mildly thickened.

Trace tricuspid regurgitation present.

There is no pulmonic regurgitation present.

The aortic root size is normal.

Normal inferior vena cava with normal inspiratory collapse consistent with estimated right atrial pre
ssure of  5 mmHg.

There is no pericardial effusion.



CONCLUSIONS

--------

1. The left ventricular size is normal.

2. Left ventricular wall thickness is normal.

3. Overall left ventricular systolic function is normal with, an EF between 60 - 65 %.

4. The aortic valve is trileaflet and appears structurally normal.

5. Trace tricuspid regurgitation present.

6. There is no pericardial effusion.





SONOGRAPHER: Catalina Dugan RDCS

## 2020-08-03 NOTE — P.CONS
History of Present Illness





- Reason for Consult


Consult date: 20


Covid 19 pneumonia/


Requesting physician: Dale Dick





- Chief Complaint


Fever and shortness of breath x 2 days





- History of Present Illness


Patient is a 55-year-old  female presenting to the ER at [facility] chief complaint of increasing shortness of breath and fever for 

the last 2 days patient mentions she has been moving out of the apartment and 

has been under a lot of stress did have panic episode she's has been combining 

of increasing shortness of breath for the last 2 days along with a cough which 

has been moderate intensity with occasional sputum production no hemoptysis 

denies having pleuritic chest pain no URI symptoms some nausea but no vomiting 

no abdominal pain no diarrhea with the Center the patient has been evaluative by

the physician on arrival to the ER the patient did have a fever of 101F the 

patient did have elevated white count 16,000 with a left shift patient did have 

elevated CRP as well as pro-calcitonin, patient chest x-ray was COPD the patient

did have a CT angiogram of the chest which was negative for PE however did shows

bilateral groundglass opacity  and more confluent opacities at the lung bases 

patient has been started on Rocephin and Zithromax admitted to the hospital 

infectious disease was consulted for further management of antibiotic therapy








Review of Systems


Positive point has been  mentioned in the HPI rest of the systems are negative








Past Medical History


Past Medical History: No Reported History


History of Any Multi-Drug Resistant Organisms: None Reported


Past Surgical History:  Section


Past Psychological History: Anxiety


Smoking Status: Former smoker


Past Alcohol Use History: Occasional


Past Drug Use History: None Reported





- Past Family History


  ** Mother


Additional Family Medical History / Comment(s): valve replacement with pacemaker





Medications and Allergies


                                Home Medications











 Medication  Instructions  Recorded  Confirmed  Type


 


No Known Home Medications  20 History








                                    Allergies











Allergy/AdvReac Type Severity Reaction Status Date / Time


 


Sulfa (Sulfonamide Allergy  Rash/Hives Verified 20 14:41





Antibiotics)     














Physical Exam


Vitals: 


                                   Vital Signs











  Temp Pulse Resp BP BP Pulse Ox


 


 20 15:00  100.6 F H  105 H  24   95/64  94 L


 


 20 11:47  97.3 F L  98    101/67  95


 


 20 11:40       88 L


 


 20 07:00  99.5 F  118 H  18   114/63  92 L


 


 20 04:00    18   


 


 20 00:50  99.0 F     


 


 20 00:29  101.3 F H  120 H  18   117/75  93 L


 


 20 00:00    18   


 


 20 20:00    18   


 


 20 19:17  99.3 F  79  18  106/69   93 L


 


 20 16:10  98.4 F  94  18  114/76   99








                                Intake and Output











 20





 06:59 14:59 22:59


 


Other:   


 


  # Voids 2  











GENERAL DESCRIPTION: Middle-aged female lying in bed, no distress. No tachypnea 

or accessory muscle of respiration use.


HEENT: Shows Pallor , no scleral icterus. Oral mucous membrane is dry. No 

pharyngeal erythema or thrush


NECK: Trachea central, no thyromegaly.


LUNGS: Unlabored breathing.  Coarse breath sounds at the bases bilaterally.


HEART: S1, S2, regular rate and rhythm. No loud murmur


ABDOMEN: Soft, no tenderness , guarding or rigidity, no organomegaly


EXTREMITIES: No edema of feet.


SKIN: No rash, no masses palpable.


NEUROLOGICAL: The patient is awake, alert, oriented x3, mood and affect normal.

















Results


CBC & Chem 7: 


                                 20 05:29





                                 20 05:29


Labs: 


                  Abnormal Lab Results - Last 24 Hours (Table)











  20 Range/Units





  12:12 23:15 23:15 


 


WBC     (3.8-10.6)  k/uL


 


Neutrophils #     (1.3-7.7)  k/uL


 


Lymphocytes #     (1.0-4.8)  k/uL


 


Sodium     (137-145)  mmol/L


 


BUN     (7-17)  mg/dL


 


Total Protein     (6.3-8.2)  g/dL


 


Procalcitonin  0.10 H    (0.02-0.09)  ng/mL


 


Urine Protein    Trace H  (Negative)  


 


Urine Ketones    2+ H  (Negative)  


 


U Benzodiazepines Scrn   Detected H   (NotDetected)  














  20 Range/Units





  05:29 05:29 


 


WBC  13.4 H   (3.8-10.6)  k/uL


 


Neutrophils #  11.7 H   (1.3-7.7)  k/uL


 


Lymphocytes #  0.8 L   (1.0-4.8)  k/uL


 


Sodium   133 L  (137-145)  mmol/L


 


BUN   6 L  (7-17)  mg/dL


 


Total Protein   6.2 L  (6.3-8.2)  g/dL


 


Procalcitonin    (0.02-0.09)  ng/mL


 


Urine Protein    (Negative)  


 


Urine Ketones    (Negative)  


 


U Benzodiazepines Scrn    (NotDetected)  








                      Microbiology - Last 24 Hours (Table)











 20 12:51 Blood Culture - Preliminary





 Blood    No Growth after 24 hours














Assessment and Plan


Assessment: 


1- patient presented to the hospital with sepsis this patient who did have a 

fever tachycardia and elevated white count source is likely pneumonia likely 

concerning for bacterial Acute viral pneumonia secondary toCovid 19 not entirely

excluded


2-  sulfa ALLERGY





(1) Sepsis


Current Visit: Yes   Status: Acute   Code(s): A41.9 - SEPSIS, UNSPECIFIED 

ORGANISM   SNOMED Code(s): 06907206


   





(2) Suspected COVID-19 virus infection


Current Visit: Yes   Status: Acute   Code(s): Z20.828 - CONTACT W AND EXPOSURE 

TO OTH VIRAL COMMUNICABLE DISEASES   SNOMED Code(s): 989617101


   





(3) Pneumonia


Current Visit: Yes   Status: Acute   Code(s): J18.9 - PNEUMONIA, UNSPECIFIED 

ORGANISM   SNOMED Code(s): 914405478


   


Plan: 


1- we will obtain sputum for Gram stain and culture and check a urine for 

Legionella antigen


2- Rocephin and Zithromax to continue


3- continue with appropriate isolation until the NP swab is back


We will follow on clinical condition and cultures to further adjust medication 

if needed


Thank you for this consultation will follow this patient with you





Time with Patient: Greater than 30

## 2020-08-04 LAB
ALBUMIN SERPL-MCNC: 3.1 G/DL (ref 3.5–5)
ALP SERPL-CCNC: 53 U/L (ref 38–126)
ALT SERPL-CCNC: 12 U/L (ref 4–34)
ANION GAP SERPL CALC-SCNC: 5 MMOL/L
AST SERPL-CCNC: 13 U/L (ref 14–36)
BASOPHILS # BLD AUTO: 0.1 K/UL (ref 0–0.2)
BASOPHILS NFR BLD AUTO: 1 %
BUN SERPL-SCNC: 8 MG/DL (ref 7–17)
CALCIUM SPEC-MCNC: 8.8 MG/DL (ref 8.4–10.2)
CHLORIDE SERPL-SCNC: 106 MMOL/L (ref 98–107)
CO2 SERPL-SCNC: 27 MMOL/L (ref 22–30)
EOSINOPHIL # BLD AUTO: 0.8 K/UL (ref 0–0.7)
EOSINOPHIL NFR BLD AUTO: 8 %
ERYTHROCYTE [DISTWIDTH] IN BLOOD BY AUTOMATED COUNT: 4.28 M/UL (ref 3.8–5.4)
ERYTHROCYTE [DISTWIDTH] IN BLOOD: 12.6 % (ref 11.5–15.5)
GLUCOSE SERPL-MCNC: 78 MG/DL (ref 74–99)
HCT VFR BLD AUTO: 42 % (ref 34–46)
HGB BLD-MCNC: 13.4 GM/DL (ref 11.4–16)
LYMPHOCYTES # SPEC AUTO: 1.8 K/UL (ref 1–4.8)
LYMPHOCYTES NFR SPEC AUTO: 16 %
MCH RBC QN AUTO: 31.3 PG (ref 25–35)
MCHC RBC AUTO-ENTMCNC: 31.8 G/DL (ref 31–37)
MCV RBC AUTO: 98.2 FL (ref 80–100)
MONOCYTES # BLD AUTO: 0.5 K/UL (ref 0–1)
MONOCYTES NFR BLD AUTO: 5 %
NEUTROPHILS # BLD AUTO: 7.7 K/UL (ref 1.3–7.7)
NEUTROPHILS NFR BLD AUTO: 69 %
PLATELET # BLD AUTO: 308 K/UL (ref 150–450)
POTASSIUM SERPL-SCNC: 4 MMOL/L (ref 3.5–5.1)
PROT SERPL-MCNC: 5.5 G/DL (ref 6.3–8.2)
SODIUM SERPL-SCNC: 138 MMOL/L (ref 137–145)
WBC # BLD AUTO: 11.1 K/UL (ref 3.8–10.6)

## 2020-08-04 RX ADMIN — Medication SCH MG: at 08:11

## 2020-08-04 RX ADMIN — ALBUTEROL SULFATE SCH PUFF: 90 AEROSOL, METERED RESPIRATORY (INHALATION) at 11:30

## 2020-08-04 RX ADMIN — GUAIFENESIN PRN MG: 200 SOLUTION ORAL at 22:09

## 2020-08-04 RX ADMIN — NYSTATIN SCH UNIT: 100000 SUSPENSION ORAL at 17:07

## 2020-08-04 RX ADMIN — ALBUTEROL SULFATE SCH PUFF: 90 AEROSOL, METERED RESPIRATORY (INHALATION) at 15:30

## 2020-08-04 RX ADMIN — CEFAZOLIN SCH MLS/HR: 330 INJECTION, POWDER, FOR SOLUTION INTRAMUSCULAR; INTRAVENOUS at 21:03

## 2020-08-04 RX ADMIN — ALBUTEROL SULFATE SCH PUFF: 90 AEROSOL, METERED RESPIRATORY (INHALATION) at 19:20

## 2020-08-04 RX ADMIN — GUAIFENESIN PRN MG: 200 SOLUTION ORAL at 05:00

## 2020-08-04 RX ADMIN — CEFAZOLIN SCH MLS/HR: 330 INJECTION, POWDER, FOR SOLUTION INTRAMUSCULAR; INTRAVENOUS at 05:00

## 2020-08-04 RX ADMIN — FLUTICASONE PROPIONATE SCH PUFF: 110 AEROSOL, METERED RESPIRATORY (INHALATION) at 19:20

## 2020-08-04 RX ADMIN — NYSTATIN SCH UNIT: 100000 SUSPENSION ORAL at 11:55

## 2020-08-04 RX ADMIN — TEMAZEPAM PRN MG: 15 CAPSULE ORAL at 00:35

## 2020-08-04 RX ADMIN — OXYCODONE HYDROCHLORIDE AND ACETAMINOPHEN SCH MG: 500 TABLET ORAL at 08:09

## 2020-08-04 RX ADMIN — AZITHROMYCIN SCH MG: 500 TABLET, FILM COATED ORAL at 14:45

## 2020-08-04 RX ADMIN — NYSTATIN SCH UNIT: 100000 SUSPENSION ORAL at 21:03

## 2020-08-04 RX ADMIN — ENOXAPARIN SODIUM SCH: 40 INJECTION SUBCUTANEOUS at 08:11

## 2020-08-04 RX ADMIN — NYSTATIN SCH UNIT: 100000 SUSPENSION ORAL at 08:09

## 2020-08-04 RX ADMIN — FLUTICASONE PROPIONATE SCH PUFF: 110 AEROSOL, METERED RESPIRATORY (INHALATION) at 07:21

## 2020-08-04 RX ADMIN — ALBUTEROL SULFATE SCH PUFF: 90 AEROSOL, METERED RESPIRATORY (INHALATION) at 07:21

## 2020-08-04 NOTE — PN
PROGRESS NOTE



DATE OF SERVICE:

08/04/2020



REASON FOR FOLLOWUP:

Pneumonia.



INTERVAL HISTORY:

Patient is currently afebrile. Patient is breathing comfortably. The patient denies

having any chest pain.  She did have a cough, though decreased in intensity.  No

nausea, no vomiting.  No abdominal pain or diarrhea.



PHYSICAL EXAMINATION:

Blood pressure 119/79 with a pulse of 104, temperature 98.3. She is 94% on room air.

General description is a middle-aged female up in the bed in no distress. Respiratory

system: Unlabored breathing, decreased intensity of breath sounds.  No wheeze.  Heart

S1, S2.  Regular rate and rhythm. Abdomen is soft, no tenderness.



LABS:

Hemoglobin 13.4, white count 11.1 with BUN of 8, creatinine 0.47.



DIAGNOSTIC IMPRESSION AND PLAN:

Patient with acute pneumonia, possible community-acquired. The patient has shown

overall clinical improvement on the Rocephin and Zithromax. To continue to finish

therapy with oral antibiotics and monitor clinical course closely.





MMODL / IJN: [id number] / Job#: [id number]

## 2020-08-04 NOTE — P.PN
Subjective


Progress Note Date: 08/04/20


Principal diagnosis: 


 COVID 19 infection





 August 3, 2020 patient seen in follow-up on the general medical surgical floor.

 Patient is awake and alert, in no acute distress, she is currently on 2 L of 

oxygen the pulse ox 100%, hemodynamically patient is stable, afebrile.  Coughing

less, breathing seems to be nonlabored.  She remains on a combination of 

azithromycin and Rocephin and Decadron.  Today's labs have been reviewed, show a

blood cell count is 11.0, lymphocytes 0.9, sodium is 137, potassium is 5.2, 

depressed electrolytes and renal profile were unremarkable.  Blood culture show 

no growth.  This had no acute events overnight, no reports of nausea vomiting no

diarrhea.  Echocardiogram has been reviewed showing preserved left ventricle 

systolic function and EF of 60-65%





On 08/04/2020 patient seen in follow-up on the general medical surgical floor.  

Her Covid 19 testing came back negative.  The patient is stable, room air pulse 

ox is 97%, she does have some limited cough, and she short of breath with 

exertion, but appears to be pretty comfortable at rest, afebrile.  She states 

she could not sleep well at night, feels little tired, denies any chest 

discomfort, today's labs have been reviewed, showing well until, of 11.1, 

hemoglobin of 13.1, electrolytes and renal profile were unremarkable, blood 

cultures have been negative, vital signs have been stable, she remains on a 

combination of azithromycin and Rocephin, gentle IV hydration, GI and DVT 

prophylaxis, remains stable overnight.








Objective





- Vital Signs


Vital signs: 


                                   Vital Signs











Temp  97.8 F   08/04/20 07:59


 


Pulse  84   08/04/20 08:13


 


Resp  18   08/04/20 08:13


 


BP  130/80   08/04/20 07:59


 


Pulse Ox  97   08/04/20 07:59








                                 Intake & Output











 08/03/20 08/04/20 08/04/20





 18:59 06:59 18:59


 


Intake Total 400  


 


Balance 400  


 


Intake:   


 


  Intake, IV Titration 400  





  Amount   


 


    Sodium Chloride 0.9% 1, 350  





    000 ml @ 50 mls/hr IV .   





    Q20H [facility] Rx#:[id number]   


 


    cefTRIAXone 1 gm In 50  





    Sodium Chloride 0.9% 50   





    ml @ 100 mls/hr IVPB   





    Q24HR [facility] Rx#:[id number]   














- Exam


 GENERAL EXAM: Alert, very pleasant, 55-year-old white female, on room air pulse

ox of 97%, resting comfortably in bed, comfortable in no apparent distress.


HEAD: Normocephalic/atraumatic.


EYES: Normal reaction of pupils, equal size.  Conjunctiva pink, sclera white.


NOSE: Clear with pink turbinates.


THROAT: No erythema or exudates.


NECK: No masses, no JVD, no thyroid enlargement, no adenopathy.


CHEST: No chest wall deformity.  Symmetrical expansion. 


LUNGS: Equal air entry with no crackles, wheeze, rhonchi or dullness.


CVS: Regular rate and rhythm, normal S1 and S2, no gallops, no murmurs, no rubs


ABDOMEN: Soft, nontender.  No hepatosplenomegaly, normal bowel sounds, no 

guarding or rigidity.


EXTREMITIES: No clubbing, no edema, no cyanosis, 2+ pulses and upper and lower 

extremities.


MUSCULOSKELETAL: Muscle strength and tone normal.


SPINE: No scoliosis or deformity


SKIN: No rashes


CENTRAL NERVOUS SYSTEM: Alert and oriented -3.  No focal deficits, tone is 

normal in all 4 extremities.


PSYCHIATRIC: Alert and oriented -3.  Appropriate affect.  Intact judgment and 

insight.











- Labs


CBC & Chem 7: 


                                 08/04/20 06:39





                                 08/04/20 06:39


Labs: 


                  Abnormal Lab Results - Last 24 Hours (Table)











  08/04/20 08/04/20 Range/Units





  06:39 06:39 


 


WBC  11.1 H   (3.8-10.6)  k/uL


 


Eosinophils #  0.8 H   (0-0.7)  k/uL


 


Creatinine   0.47 L  (0.52-1.04)  mg/dL


 


AST   13 L  (14-36)  U/L


 


Total Protein   5.5 L  (6.3-8.2)  g/dL


 


Albumin   3.1 L  (3.5-5.0)  g/dL








                      Microbiology - Last 24 Hours (Table)











 08/01/20 12:51 Blood Culture - Preliminary





 Blood    No Growth after 48 hours














Assessment and Plan


Plan: 


 Assessment:





#1.  Shortness of breath, fever, and patchy bilateral groundglass airspace 

disease that was initially suspected to be Covid 19 related pneumonitis, however

COVID 19 PCR came back negative.  Possibility of atypical pneumonia, or another 

viral infection is considered, and patient is covered with a culmination of 

azithromycin and Rocephin.  Legionella urine antigen came back negative





#2.  Legionella pneumonia has been ruled out





#3.  Previous history of tobacco use currently in remission





#4.  Mild leukocytosis improving





#5.  Mild elevation of d-dimer 0.87, CTA chest did not show any evidence of 

pulmonary embolism, CRP elevated at 210, and LDH is 335 with the possibility of 

Covid 19 related infection





Plan:





Patient is doing well, she is on room air, increase activity as tolerated, 

continuing current antibiotics, from pulmonary perspective patient can be 

considered for discharge home in the next 24 hours she continues to improve, her

Covid 19 PCR came back negative, suggest patient has a antibody test once her 

symptoms completely resolved. 





I performed a history & physical examination of the patient and discussed their 

management with my nurse practitioner, Sarah Valdez.  I reviewed the nurse 

practitioner's note and agree with the documented findings and plan of care.  

Lung sounds are positive for diminished.  The findings and the impression was 

discussed with the patient.  I attest to the documentation by the nurse 

practitioner. 





Time with Patient: Less than 30

## 2020-08-04 NOTE — XR
EXAMINATION TYPE: XR chest 1V portable

 

DATE OF EXAM: 8/4/2020

 

HISTORY: Shortness of breath.

 

COMPARISON: August 2, 2020

 

TECHNIQUE: Single view of the chest is submitted.

 

FINDINGS:

Demonstrated are scattered senescent parenchymal change.  

 

Smaller right lower lobe infiltrate with small effusion.

 

The heart is stable.

 

Hilar and mediastinal structures are within normal limits.  

 

Degenerative changes are seen of the dorsal spine. 

 

 IMPRESSION: 

 

1.  Smaller right lower lobe infiltrate with small effusion.

## 2020-08-04 NOTE — PN
PROGRESS NOTE



DATE OF SERVICE:

08/04/2020



This is a 55-year-old woman who was admitted with bilateral interstitial pneumonia with

suspected COVID, but; however, COVID-19 test is negative, which could be false

negative.  The patient is on symptomatic treatment, patient is being closely monitored.

No chest pain.  No palpitations.



PAST MEDICAL HISTORY:

Reviewed.



PHYSICAL EXAM:

The patient is alert and oriented x3.  Pulse 84, blood pressure 130/80, respiration 18,

temperature 97.8, pulse ox 97% on room air.

HEENT:  Conjunctivae normal.

NECK:  No jugular venous distension.

CARDIOVASCULAR:  S1, S2, muffled.

RESPIRATION:  Breath sounds diminished at the bases, no scattered rhonchi, no crackles.

ABDOMEN:  Soft, nontender.

LEGS: No edema. No swelling.

NERVOUS SYSTEM:  No focal deficits.



LABS:

A 2D echo with Doppler, showed ejection fraction 60%-65%.  The D-dimer is 0.87.  ESR is

17 and CRP was 210, the LDH was normal.



ASSESSMENT:

1. Acute bilateral interstitial pneumonia, possibly KOVID-19 pneumonia, bilateral

    pneumonia. COVID test is negative, could be a false negative.

2. Increased WBC.

3. Increased D-dimer without any evidence of acute pulmonary embolism.

4. Right thyroid lesion 1.2 cm, needs outpatient followup and CAT scan.

5. Hyponatremia.

6. Increased CRP.

7. History of anxiety.

8. History of nicotine dependence.

9. FULL CODE.



RECOMMENDATION:

I recommend to continue current management and symptomatic treatment.  Otherwise, will

continue with the antibiotics.  The rest of the medications.  Increase ambulation and

follow closely with Pulmonary and once the pulmonary is cleared, the patient will be

able to be discharged home.  Prognosis guarded.  Further recommendation to follow.





MMODL / IJN: [id number] / Job#: [id number]

## 2020-08-05 VITALS
RESPIRATION RATE: 17 BRPM | HEART RATE: 72 BPM | DIASTOLIC BLOOD PRESSURE: 79 MMHG | TEMPERATURE: 98.2 F | SYSTOLIC BLOOD PRESSURE: 130 MMHG

## 2020-08-05 RX ADMIN — NYSTATIN SCH: 100000 SUSPENSION ORAL at 13:26

## 2020-08-05 RX ADMIN — ENOXAPARIN SODIUM SCH MG: 40 INJECTION SUBCUTANEOUS at 09:22

## 2020-08-05 RX ADMIN — ACETAMINOPHEN PRN MG: 500 TABLET ORAL at 05:06

## 2020-08-05 RX ADMIN — ENOXAPARIN SODIUM SCH: 40 INJECTION SUBCUTANEOUS at 09:24

## 2020-08-05 RX ADMIN — ALBUTEROL SULFATE SCH PUFF: 90 AEROSOL, METERED RESPIRATORY (INHALATION) at 07:55

## 2020-08-05 RX ADMIN — ALBUTEROL SULFATE SCH PUFF: 90 AEROSOL, METERED RESPIRATORY (INHALATION) at 11:31

## 2020-08-05 RX ADMIN — Medication SCH MG: at 09:22

## 2020-08-05 RX ADMIN — OXYCODONE HYDROCHLORIDE AND ACETAMINOPHEN SCH MG: 500 TABLET ORAL at 09:22

## 2020-08-05 RX ADMIN — NYSTATIN SCH UNIT: 100000 SUSPENSION ORAL at 09:22

## 2020-08-05 RX ADMIN — FLUTICASONE PROPIONATE SCH PUFF: 110 AEROSOL, METERED RESPIRATORY (INHALATION) at 07:56

## 2020-08-05 NOTE — P.PN
Subjective


Progress Note Date: 08/05/20


Principal diagnosis: 


 COVID 19 infection





 August 3, 2020 patient seen in follow-up on the general medical surgical floor.

 Patient is awake and alert, in no acute distress, she is currently on 2 L of 

oxygen the pulse ox 100%, hemodynamically patient is stable, afebrile.  Coughing

less, breathing seems to be nonlabored.  She remains on a combination of 

azithromycin and Rocephin and Decadron.  Today's labs have been reviewed, show a

blood cell count is 11.0, lymphocytes 0.9, sodium is 137, potassium is 5.2, 

depressed electrolytes and renal profile were unremarkable.  Blood culture show 

no growth.  This had no acute events overnight, no reports of nausea vomiting no

diarrhea.  Echocardiogram has been reviewed showing preserved left ventricle 

systolic function and EF of 60-65%





On 08/04/2020 patient seen in follow-up on the general medical surgical floor.  

Her Covid 19 testing came back negative.  The patient is stable, room air pulse 

ox is 97%, she does have some limited cough, and she short of breath with 

exertion, but appears to be pretty comfortable at rest, afebrile.  She states 

she could not sleep well at night, feels little tired, denies any chest 

discomfort, today's labs have been reviewed, showing well until, of 11.1, 

hemoglobin of 13.1, electrolytes and renal profile were unremarkable, blood 

cultures have been negative, vital signs have been stable, she remains on a 

combination of azithromycin and Rocephin, gentle IV hydration, GI and DVT 

prophylaxis, remains stable overnight.





On 08/05/2020 patient seen in follow-up on general medical surgical floor.  She 

is doing well, is currently on room air, pulse ox is 99%, denies any chest 

discomfort, denies any worsening dyspnea, breathing seems to be comfortable, no 

fever or chills, some exertional dyspnea, otherwise no specific complaints.  Her

cough has improved, there is been no nausea vomiting diarrhea, no abdominal 

pain.  His CRP has significantly improved, and is down to 32.9 on today's labs, 

patient remains on empiric antibiotics, her Legionella urine antigen was 

negative.  No acute events overnight, she is anticipated to be discharged home 

today








Objective





- Vital Signs


Vital signs: 


                                   Vital Signs











Temp  98.2 F   08/05/20 07:00


 


Pulse  72   08/05/20 07:00


 


Resp  17   08/05/20 07:00


 


BP  130/79   08/05/20 07:00


 


Pulse Ox  99   08/05/20 07:00








                                 Intake & Output











 08/04/20 08/05/20 08/05/20





 18:59 06:59 18:59


 


Intake Total 1080 1400 


 


Balance 1080 1400 


 


Intake:   


 


  Intake, IV Titration  600 





  Amount   


 


    Sodium Chloride 0.9% 1,  600 





    000 ml @ 50 mls/hr IV .   





    Q20H JULITO Rx#:[id number]   


 


  Oral 1080 800 


 


Other:   


 


  # Voids 3 3 














- Exam


 GENERAL EXAM: Alert, very pleasant, 55-year-old white female, on room air pulse

ox of 97%, resting comfortably in bed, comfortable in no apparent distress.


HEAD: Normocephalic/atraumatic.


EYES: Normal reaction of pupils, equal size.  Conjunctiva pink, sclera white.


NOSE: Clear with pink turbinates.


THROAT: No erythema or exudates.


NECK: No masses, no JVD, no thyroid enlargement, no adenopathy.


CHEST: No chest wall deformity.  Symmetrical expansion. 


LUNGS: Equal air entry with no crackles, wheeze, rhonchi or dullness.


CVS: Regular rate and rhythm, normal S1 and S2, no gallops, no murmurs, no rubs


ABDOMEN: Soft, nontender.  No hepatosplenomegaly, normal bowel sounds, no 

guarding or rigidity.


EXTREMITIES: No clubbing, no edema, no cyanosis, 2+ pulses and upper and lower 

extremities.


MUSCULOSKELETAL: Muscle strength and tone normal.


SPINE: No scoliosis or deformity


SKIN: No rashes


CENTRAL NERVOUS SYSTEM: Alert and oriented -3.  No focal deficits, tone is 

normal in all 4 extremities.


PSYCHIATRIC: Alert and oriented -3.  Appropriate affect.  Intact judgment and 

insight.











- Labs


CBC & Chem 7: 


                                 08/04/20 06:39





                                 08/04/20 06:39


Labs: 


                  Abnormal Lab Results - Last 24 Hours (Table)











  08/05/20 Range/Units





  08:19 


 


C-Reactive Protein  32.9 H  (<10.0)  mg/L








                      Microbiology - Last 24 Hours (Table)











 08/01/20 12:51 Blood Culture - Preliminary





 Blood    No Growth after 72 hours














Assessment and Plan


Plan: 


 Assessment:





#1.  Shortness of breath, fever, and patchy bilateral groundglass airspace 

disease that was initially suspected to be Covid 19 related pneumonitis, however

COVID 19 PCR came back negative.  Possibility of atypical pneumonia, or another 

viral infection is considered, and patient is covered with a culmination of 

azithromycin and Rocephin.  Legionella urine antigen came back negative





#2.  Legionella pneumonia has been ruled out





#3.  Previous history of tobacco use currently in remission





#4.  Mild leukocytosis improving





#5.  Mild elevation of d-dimer 0.87, CTA chest did not show any evidence of 

pulmonary embolism, CRP elevated at 210, and LDH is 335 with the possibility of 

Covid 19 related infection





Plan:





Patient is stable for discharge home today, as remains stable overnight, no 

fever or chills, her cough is improving, no  chest pain, no nausea vomiting 

diarrhea.  Patient was advised to wait until complete resolution of her symptoms

to move down to [place].  She is clinically stable, increase activity as 

tolerated. 


I performed a history & physical examination of the patient and discussed their 

management with my nurse practitioner, Sarah Valdez.  I reviewed the nurse 

practitioner's note and agree with the documented findings and plan of care.  

Lung sounds are positive for diminished.  The findings and the impression was 

discussed with the patient.  I attest to the documentation by the nurse 

practitioner. 





Time with Patient: Less than 30

## 2020-08-05 NOTE — P.DS
Providers


Date of admission: 


08/01/20 14:44





Expected date of discharge: 08/05/20


Attending physician: 


Dale Dick





Consults: 





                                        





08/01/20 14:44


Consult Physician Routine 


   Consulting Provider: Charisse Segovia


   Consult Reason/Comments: Pneumonia


   Do you want consulting provider notified?: Yes





08/01/20 16:50


Consult Physician Routine 


   Consulting Provider: Laisha Ramirez


   Consult Reason/Comments: rule out covid


   Do you want consulting provider notified?: Yes











Primary care physician: 


Luis Funez





[facility] Course: 





Final diagnosis


Acute bilateral interstitial pneumonia, possibly Covid 19 pneumonia, bilateral 

pneumonia.  Covid testing was negative, could be a false negative


Increased white blood count


Increased d-dimer without any evidence of acute pulmonary embolism


Right thyroid lesion 1.2 cm, needs outpatient follow-up CAT scan


Hyponatremia


Increased CRP


History of anxiety


History of nicotine dependence


Full code





Discharge disposition


Patient is being discharged in a stable condition with guarded prognosis to 

home.  Patient will follow-up with Dr. Funez in the outpatient setting upon 

discharge.   Patient is to continue with a short course of oral antibiotics in 

the form of azithromycin 500 mg  daily for the next 4 days to complete a course 

along with a prednisone taper.  Patient instructed to continue to isolate for an

additional week and until clearance of symptoms for at least 3 days.  Total time

taken is greater than 35 minutes.





History of present illness 


This is a 55-year-old female who was recently admitted with bilateral 

interstitial pneumonia with suspected Covid and was being closely monitored.  

Patient had Covid testing which was negative but could possibly be a false 

negative.  Patient was currently maintained on breathing inhalational treatments

along with steroids and antibiotics.  Patient will continue with oral 

antibiotics in the form of azithromycin 500 mg daily for the next 4 days along 

with a Medrol Dosepak and was provided inhalers. Patient was seen and evaluated 

by pulmonary.  Patient continues to have some anxiety given current personal 

issues along with living situation and will be moving down to [place] with her 

mother to care for her.  A prescription for Xanax was provided.  Patient 

instructed to follow-up with her primary care provider in the outpatient setting

upon discharge.  Currently no reports of chest pain, worsening shortness of 

breath, or palpitations.  Patient is afebrile.  No reports of nausea or vomiting

and patient is tolerating diet.  Patient will be discharged home today.  Patient

instructed to continue to isolate for an additional one week and continue until 

symptom-free for at least 3 days.





On exam vital signs are stable.  Temp is 98.2F, pulse is 72, respirations are 

17, blood pressure is 130/79, oxygen saturation is 99% on room air.  Cardio S1, 

S2 are muffled.  Respiratory system shows diminished breath sounds at the bases 

with some scattered rhonchi noted.  Abdomen is soft and nontender.  Nervous 

system shows no focal deficits.





Please refer to medication reconciliation sheet for a list of medications.





Patient Condition at Discharge: Fair





Plan - Discharge Summary


Discharge Rx Participant: Yes


New Discharge Prescriptions: 


New


   methylPREDNISolone Dose Pack [Medrol Dose Pack] 4 mg PO AS DIRECTED #21 

package


   Nystatin 100,000 Unit/ml Susp [Mycostatin Oral Susp] 500,000 unit PO QID #100

ml


   Zinc Sulfate [Orazinc] 220 mg PO DAILY 30 Days #30 cap


   guaiFENesin SYRUP 100MG/5ML [Robitussin] 200 mg PO TID PRN #120 ml


     PRN Reason: Cough


   Albuterol Inhaler [Ventolin Hfa Inhaler] 2 puff INHALATION RT-QID 30 Days #1 

puff


   Ascorbic Acid [Vitamin C] 500 mg PO DAILY 30 Days #30 tab


   ALPRAZolam [Xanax] 0.25 mg PO TID PRN #12 tab


     PRN Reason: Anxiety


   Azithromycin [Zithromax] 500 mg PO DAILY@1500 4 Days #4 tab


Discharge Medication List





ALPRAZolam [Xanax] 0.25 mg PO TID PRN #12 tab 08/05/20 [Rx]


Albuterol Inhaler [Ventolin Hfa Inhaler] 2 puff INHALATION RT-QID 30 Days #1 

puff 08/05/20 [Rx]


Ascorbic Acid [Vitamin C] 500 mg PO DAILY 30 Days #30 tab 08/05/20 [Rx]


Azithromycin [Zithromax] 500 mg PO DAILY@1500 4 Days #4 tab 08/05/20 [Rx]


Nystatin 100,000 Unit/ml Susp [Mycostatin Oral Susp] 500,000 unit PO QID #100 ml

08/05/20 [Rx]


Zinc Sulfate [Orazinc] 220 mg PO DAILY 30 Days #30 cap 08/05/20 [Rx]


guaiFENesin SYRUP 100MG/5ML [Robitussin] 200 mg PO TID PRN #120 ml 08/05/20 [Rx]


methylPREDNISolone Dose Pack [Medrol Dose Pack] 4 mg PO AS DIRECTED #21 package 

08/05/20 [Rx]








Follow up Appointment(s)/Referral(s): 


Luis uFnez MD [Primary Care Provider] - 1-2 days (office will call with 

appointment time)


Patient Instructions/Handouts:  Viral Pneumonia (DC)


Activity/Diet/Wound Care/Special Instructions: 


Indigent form placed in chart





Faxed prescriptions to [facility]


Activity Limited until follow-up


Continue with antibiotics until finished


Continue to isolate for 1 week


Follow-up with primary care provider upon discharge





Discharge Disposition: HOME SELF-CARE

## 2020-08-07 NOTE — CDI
Documentation Clarification Form

Date: 8/7/2020

From: Trena Morrison

Phone: If you have a question about this query, please contact Eugenia Posey 
 at [phone] between 8am and 5pm.

Admit Date: 08/01/2020 

Discharge Date: 08/05/2020

Patient Name: Karen Olson

Visit Number: [id number]



ATTENTION: The Clinical Documentation Specialists (CDI) and [facility] Coding Staff 
appreciate your assistance in clarifying documentation. Please respond to the 
clarification below the line at the bottom and electronically sign. The CDI & 
[facility] Coding staff will review the response and follow-up if needed. Please note: 
Queries are made part of the Legal Health Record. If you have any questions, 
please contact the author of this message via ITS.



Dear Dale Packer MD.,



The patient presented with Acute Bilateral pneumonia. Covid false negative per 
DS.



History/Risk Factors: Bilateral pneumonia, COPD, Pleural effusion, Anxiety

WBC : 16.2H   

Lactic acid: 1.0H 

Blood cultures: Negative

Vitals signs on admission: Temperature 100.1 F H

Pulse Rate 131 H

Respiratory 22 18 18

Rate

Blood Pressure 122/75

O2 Sat by Pulse 92 L 93 L 93 L



Treatment: Rocephin and Zithromax, continue with appropriate isolation until the
NP swab is back



Per 8/2 Laisha Whitlock MD Notes  mentioned "patient presented to the hospital
with sepsis this patient who did have afever tachycardia and elevated white 
count source is likely pneumonia likely concerning for bacterial Acute viral 
pneumonia secondary toCovid 19 not entirely excluded".



In your professional opinion, please clarify the Condition presence?

Condition

   Sepsis ruled out

   SIRS, without underlying infectious process

   Sepsis

   Severe Sepsis 

   Septic Shock

   Other, please specify  _____________

   Unable to determine

___________________________________________________________________________



Sepsis

MTDD

## 2020-08-07 NOTE — P.PN
Progress Note - Text


Progress Note Date: 08/05/20


REASON FOR FOLLOWUP:


Pneumonia.





INTERVAL HISTORY:


Patient remains to be  afebrile. Patient is breathing comfortably. The patient 

denies


having any chest pain.  She cough has decreased in intensity.  No


nausea, no vomiting.  No abdominal pain or diarrhea.





PHYSICAL EXAMINATION:


Blood pressure 120/70 with a pulse of 104, temperature 98.3. She is 94% on room 

air.


General description is a middle-aged female up in the bed in no distress. 

Respiratory


system: Unlabored breathing, decreased intensity of breath sounds.  No wheeze.  

Heart


S1, S2.  Regular rate and rhythm. Abdomen is soft, no tenderness.





LABS: Reviewed, blood culture negativeand collected


.





DIAGNOSTIC IMPRESSION AND PLAN:


Patient with acute pneumonia, possible community-acquired. The patient has shown


overall clinical improvement on the Rocephin and Zithromax. To continue to 

finish


therapy with oral Ceftin 500 mg twice a day x 7 days .

## 2020-08-11 NOTE — CDI
Documentation Clarification Form

Date: 08/11/2020

From: Trena Morrison

Phone: If you have a question about this query, please contact Eugenia Posey, 
 at [phone] between 8am and 5pm.

Admit Date: 08/01/2020

Discharge Date: 08/05/2020

Patient Name: Karen Olson

Visit Number: [id number]



ATTENTION: The Clinical Documentation Specialists (CDI) and [facility] Coding Staff 
appreciate your assistance in clarifying documentation. Please respond to the 
clarification below the line at the bottom and electronically sign. The CDI & 
[facility] Coding staff will review the response and follow-up if needed. Please note: 
Queries are made part of the Legal Health Record. If you have any questions, 
please contact the author of this message via ITS.



Dear Dr Kapil Hunter.,



Discharge summary stated "Acute bilateral interstitial pneumonia, possibly Covid
19 pneumonia, bilateral pneumonia. Covid testing was negative, could be a false 
negative Increased white blood count".



Patient history/risk factor: Pneumonia, Sepsis, hx of nicotine dependence, 
Anxiety.

Clinical Indicators: Fever, cough, Weakness.

CXR:  Smaller right lower lobe infiltrate with small effusion. COPD.

Labs: Coronavirus SARS CoV-2-Not Detected.



Treatment:: Rocephin and Zithromax to continue, continue with appropriate 
isolation until the NP swab is back



In order to capture the severity of condition, please clarify if the above 
treatment/clinical indicators signify:

 

  COVID-19 Suspected 

  COVID-19 ruled out

  COVID-19 confirmed

  Other, please specify __________

  Unable to determine

________________________________________________________________________



COVID-19 Suspected 

MTDD

## 2020-08-19 NOTE — CDI
Documentation Clarification Form

Date: 08/19/2020

From: Trena Morrison

Phone: If you have a question about this query, please contact Eugenia Posey,

 at [phone] between 8am and 5pm.

Admit Date: 08/01/2020

Discharge Date:  08/05/2020

Patient Name: Karen Olson

Visit Number: [id number]



ATTENTION:  

The Clinical Documentation Specialists (CDI) and [facility] Coding Staff

appreciate your assistance in clarifying documentation.Please respond to the

clarification below the line at the bottom and electronically sign.The CDI

[facility] Coding staff will review the response and follow-up if needed.Please note:

Queries are made part of the Legal Health Record.If you have any questions,

please contact the author of this message via ITS.



Dear Dr Kapil Hunter.



Discharge summary stated "Acute bilateral interstitial pneumonia, possibly Covid
19 pneumonia, bilateral pneumonia.Covid testing was negative, could be a false

negative Increased white blood count".



Patient history/risk factor: Pneumonia, Sepsis, hx of nicotine dependence,

Anxiety.

Clinical Indicators: Fever, cough, Weakness.

CXR: Smaller right lower lobe infiltrate with small effusion.COPD.

Labs: Coronavirus SARS CoV-2-Not Detected.



Treatment:: Rocephin and Zithromax to continue, continue with appropriate

isolation until the NP swab is back



In order to capture the severity of condition, please clarify if the above

treatment/clinical indicators signify:



False negative, treating for COVID-19 infection  

COVID-19 ruled out

Other, please specify __________

Unable to determine

 
________________________________________________________________________________

_____________________________

False negative, treating for COVID-19 infection 

MTDD